# Patient Record
Sex: MALE | Race: WHITE | Employment: UNEMPLOYED | URBAN - METROPOLITAN AREA
[De-identification: names, ages, dates, MRNs, and addresses within clinical notes are randomized per-mention and may not be internally consistent; named-entity substitution may affect disease eponyms.]

---

## 2018-12-21 ENCOUNTER — OFFICE VISIT (OUTPATIENT)
Dept: FAMILY MEDICINE CLINIC | Age: 28
End: 2018-12-21

## 2018-12-21 VITALS
DIASTOLIC BLOOD PRESSURE: 73 MMHG | OXYGEN SATURATION: 98 % | RESPIRATION RATE: 18 BRPM | HEART RATE: 84 BPM | BODY MASS INDEX: 32.47 KG/M2 | WEIGHT: 253 LBS | SYSTOLIC BLOOD PRESSURE: 138 MMHG | TEMPERATURE: 98.1 F | HEIGHT: 74 IN

## 2018-12-21 DIAGNOSIS — F84.0 AUTISM SPECTRUM DISORDER: ICD-10-CM

## 2018-12-21 DIAGNOSIS — F82 MOTOR DEVELOPMENTAL DELAY: ICD-10-CM

## 2018-12-21 DIAGNOSIS — E66.9 OBESITY (BMI 30.0-34.9): ICD-10-CM

## 2018-12-21 DIAGNOSIS — Z13.220 LIPID SCREENING: ICD-10-CM

## 2018-12-21 DIAGNOSIS — Z00.00 ENCOUNTER FOR PREVENTIVE CARE: ICD-10-CM

## 2018-12-21 DIAGNOSIS — G70.9 NEUROMUSCULAR DISEASE (HCC): Primary | ICD-10-CM

## 2018-12-21 DIAGNOSIS — Z23 ENCOUNTER FOR IMMUNIZATION: ICD-10-CM

## 2018-12-21 DIAGNOSIS — R48.0 DYSLEXIA: ICD-10-CM

## 2018-12-21 RX ORDER — TRAZODONE HYDROCHLORIDE 100 MG/1
TABLET ORAL
Refills: 5 | COMMUNITY
Start: 2018-12-08

## 2018-12-21 RX ORDER — METHYLPHENIDATE HYDROCHLORIDE 36 MG/1
TABLET ORAL
Refills: 0 | COMMUNITY
Start: 2018-11-14

## 2018-12-21 RX ORDER — ESCITALOPRAM OXALATE 20 MG/1
TABLET ORAL
Refills: 5 | COMMUNITY
Start: 2018-12-08

## 2018-12-21 NOTE — PROGRESS NOTES
Chief Complaint   Patient presents with    New Patient     establish care last PCP Dr. Beau Olvera last seen a few years ago      1. Have you been to the ER, urgent care clinic since your last visit? Hospitalized since your last visit? No    2. Have you seen or consulted any other health care providers outside of the [address] [place] [address] since your last visit? Include any pap smears or colon screening.  No

## 2018-12-21 NOTE — PROGRESS NOTES
[facility]  [address] [facility] [address] [place], [address]  [phone]             Date of visit: 12/21/18   Subjective:      History obtained from:  the patient. Antonieta Ware is a 33 y.o. male who presents today for new patient    Used to see Dr. Joanne Field  Used to get bloodwork, has been a couple of years    Has autism and some developmental problems that have limited him  Birth was on time, dad notes apgars were low and cord was wrapped around neck    Had club foot  Both feet in casts for months  Used to see Dr. Leti De Leon orthpedist    Later identified neuromuscular developmental problems and autism.   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 Leia Bryson    Patient Active Problem List    Diagnosis Date Noted    Autism spectrum disorder 12/21/2018    Dyslexia 12/21/2018    Neuromuscular disease ([address] 12/21/2018    Motor developmental delay 12/21/2018    Obesity (BMI 30.0-34.9) 12/21/2018     Current Outpatient Medications   Medication Sig Dispense Refill    escitalopram oxalate (LEXAPRO) 20 mg tablet TAKE 2&1/2 TABLETS BY MOUTH EVERY MORNING  5    methylphenidate ER 36 mg 24 hr tab TAKE 2 TABLETS EVERY MORNING  0    traZODone (DESYREL) 100 mg tablet TAKE 2 TABLETS BY MOUTH AT BEDTIME  5     No Known Allergies  Past Medical History:   Diagnosis Date    Autism      History reviewed. No pertinent surgical history. 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[employer]    Doesn't drive    Lives with parents        Review of Systems  Card: denies chest pain  Pulm: denies shortness of breath  GI: denies abd pain  Psych: denies depressed mood  Neuro: denies frequent headaches  All: admits to pollen allergies, doesn't take meds  Ent: denies cold symptpoms  Skin ; denies lesions  MSK: admits to mild knee pains at times   denies urinary problems     Objective:     Vitals:    18 1338   BP: 138/73   Pulse: 84   Resp: 18   Temp: 98.1 °F (36.7 °C)   TempSrc: Oral   SpO2: 98%   Weight: 253 lb (114.8 kg)   Height: 6' 2\" (1.88 m)     Body mass index is 32.48 kg/m².      General: stated age,obese, and in NAD  Eyes: PERRL, EOMI, no redness or drainage  Nose: no drainage  Mouth: no lesions  Throat: no erythema, exudate or swelling  Neck: supple, symmetrical, trachea midline, no adenopathy and thyroid: not enlarged, symmetric, no tenderness/mass/nodules  Lungs:  clear to auscultation w/o rales, rhonchi, wheezes w/normal effort and no use of accessory muscles of respiration   Heart: regular rate and rhythm, S1, S2 normal, no murmur, click, rub or gallop  Abdomen: soft, nontender, no masses  Ext:  No edema noted. Lymph: no cervical adenopathy appreciated  Skin:  Normal. and no rash or abnormalities   Neuro: normal gait, CN 2-12 intact  Psych: alert and oriented to person, place, time and situation and Speech: appropriate quality, quantity and organization of sentences, not the best eye contact but very happy affect    Assessment/Plan:       ICD-10-CM ICD-9-CM    1. Neuromuscular disease ([address] G70.9 358.9 CBC WITH AUTOMATED DIFF      METABOLIC PANEL, COMPREHENSIVE      TSH 3RD GENERATION   2. Autism spectrum disorder F84.0 299.00    3. Dyslexia R48.0 784.61    4. Motor developmental delay F82 315.4    5. Encounter for immunization Z23 V03.89 INFLUENZA VIRUS VAC QUAD,SPLIT,PRESV FREE SYRINGE IM      AL IMMUNIZ ADMIN,1 SINGLE/COMB VAC/TOXOID   6. Obesity (BMI 30.0-34. 9) E66.9 278.00 TSH 3RD GENERATION      HEMOGLOBIN A1C WITH EAG   7. Lipid screening Z13.220 V77.91 LIPID PANEL   8. Encounter for preventive care Z00.00 V70.0 CBC WITH AUTOMATED DIFF      METABOLIC PANEL, COMPREHENSIVE      LIPID PANEL      TSH 3RD GENERATION      HEMOGLOBIN A1C WITH EAG        Orders Placed This Encounter    Influenza virus vaccine (QUADRIVALENT PRES FREE SYRINGE) IM (99060)    CBC WITH AUTOMATED DIFF    METABOLIC PANEL, COMPREHENSIVE    LIPID PANEL    TSH 3RD GENERATION    HEMOGLOBIN A1C WITH EAG    AL IMMUNIZ ADMIN,1 SINGLE/COMB VAC/TOXOID    escitalopram oxalate (LEXAPRO) 20 mg tablet    methylphenidate ER 36 mg 24 hr tab    traZODone (DESYREL) 100 mg tablet     Overall doing well with his meds, mostly discussed preventive care, need to lose weight, exercise more, eat well  He seems somewhat motivated, dad tries to help him  Labs as above, as it has been a while  Need records to make sure I am not missing anything else, and also to see his shot record    Discussed the diagnosis and plan and he expressed understanding.     Follow-up Disposition:  Return in about 1 year (around 12/21/2019) for Full Physical.    Cristo Resendiz Alexsandra Mata MD

## 2018-12-21 NOTE — PATIENT INSTRUCTIONS
Please do bring the records by; I will go through them and copy what we might need, then call you to pick them up         Well Visit, Ages 25 to 48: Care Instructions  Your Care Instructions    Physical exams can help you stay healthy. Your doctor has checked your overall health and may have suggested ways to take good care of yourself. He or she also may have recommended tests. At home, you can help prevent illness with healthy eating, regular exercise, and other steps. Follow-up care is a key part of your treatment and safety. Be sure to make and go to all appointments, and call your doctor if you are having problems. It's also a good idea to know your test results and keep a list of the medicines you take. How can you care for yourself at home? · Reach and stay at a healthy weight. This will lower your risk for many problems, such as obesity, diabetes, heart disease, and high blood pressure. · Get at least 30 minutes of physical activity on most days of the week. Walking is a good choice. You also may want to do other activities, such as running, swimming, cycling, or playing tennis or team sports. Discuss any changes in your exercise program with your doctor. · Do not smoke or allow others to smoke around you. If you need help quitting, talk to your doctor about stop-smoking programs and medicines. These can increase your chances of quitting for good. · Talk to your doctor about whether you have any risk factors for sexually transmitted infections (STIs). Having one sex partner (who does not have STIs and does not have sex with anyone else) is a good way to avoid these infections. · Use birth control if you do not want to have children at this time. Talk with your doctor about the choices available and what might be best for you. · Protect your skin from too much sun. When you're outdoors from 10 a.m. to 4 p.m., stay in the shade or cover up with clothing and a hat with a wide brim.  Wear sunglasses that block UV rays. Even when it's cloudy, put broad-spectrum sunscreen (SPF 30 or higher) on any exposed skin. · See a dentist one or two times a year for checkups and to have your teeth cleaned. · Wear a seat belt in the car. · Drink alcohol in moderation, if at all. That means no more than 2 drinks a day for men and 1 drink a day for women. Follow your doctor's advice about when to have certain tests. These tests can spot problems early. For everyone  · Cholesterol. Have the fat (cholesterol) in your blood tested after age 21. Your doctor will tell you how often to have this done based on your age, family history, or other things that can increase your risk for heart disease. · Blood pressure. Have your blood pressure checked during a routine doctor visit. Your doctor will tell you how often to check your blood pressure based on your age, your blood pressure results, and other factors. · Vision. Talk with your doctor about how often to have a glaucoma test.  · Diabetes. Ask your doctor whether you should have tests for diabetes. · Colon cancer. Have a test for colon cancer at age 48. You may have one of several tests. If you are younger than 48, you may need a test earlier if you have any risk factors. Risk factors include whether you already had a precancerous polyp removed from your colon or whether your parent, brother, sister, or child has had colon cancer. For women  · Breast exam and mammogram. Talk to your doctor about when you should have a clinical breast exam and a mammogram. Medical experts differ on whether and how often women under 50 should have these tests. Your doctor can help you decide what is right for you. · Pap test and pelvic exam. Begin Pap tests at age 24. A Pap test is the best way to find cervical cancer. The test often is part of a pelvic exam. Ask how often to have this test.  · Tests for sexually transmitted infections (STIs). Ask whether you should have tests for STIs.  You may be at risk if you have sex with more than one person, especially if your partners do not wear condoms. For men  · Tests for sexually transmitted infections (STIs). Ask whether you should have tests for STIs. You may be at risk if you have sex with more than one person, especially if you do not wear a condom. · Testicular cancer exam. Ask your doctor whether you should check your testicles regularly. · Prostate exam. Talk to your doctor about whether you should have a blood test (called a PSA test) for prostate cancer. Experts differ on whether and when men should have this test. Some experts suggest it if you are older than 39 and are -American or have a father or brother who got prostate cancer when he was younger than 72. When should you call for help? Watch closely for changes in your health, and be sure to contact your doctor if you have any problems or symptoms that concern you. Where can you learn more? Go to http://florecita-gerald.info/. Enter P072 in the search box to learn more about \"Well Visit, Ages 25 to 48: Care Instructions. \"  Current as of: March 29, 2018  Content Version: 11.8  © 5159-6714 Healthwise, Incorporated. Care instructions adapted under license by [employer] (which disclaims liability or warranty for this information). If you have questions about a medical condition or this instruction, always ask your healthcare professional. [address] any warranty or liability for your use of this information.

## 2018-12-28 PROBLEM — E78.6 LOW HDL (UNDER 40): Status: ACTIVE | Noted: 2018-12-28

## 2018-12-28 PROBLEM — R74.01 ELEVATED ALT MEASUREMENT: Status: ACTIVE | Noted: 2018-12-28

## 2018-12-28 LAB
ALBUMIN SERPL-MCNC: 4.9 G/DL (ref 3.5–5.5)
ALBUMIN/GLOB SERPL: 2.2 {RATIO} (ref 1.2–2.2)
ALP SERPL-CCNC: 50 IU/L (ref 39–117)
ALT SERPL-CCNC: 59 IU/L (ref 0–44)
AST SERPL-CCNC: 27 IU/L (ref 0–40)
BASOPHILS # BLD AUTO: 0 X10E3/UL (ref 0–0.2)
BASOPHILS NFR BLD AUTO: 0 %
BILIRUB SERPL-MCNC: 0.3 MG/DL (ref 0–1.2)
BUN SERPL-MCNC: 17 MG/DL (ref 6–20)
BUN/CREAT SERPL: 27 (ref 9–20)
CALCIUM SERPL-MCNC: 9.4 MG/DL (ref 8.7–10.2)
CHLORIDE SERPL-SCNC: 108 MMOL/L (ref 96–106)
CHOLEST SERPL-MCNC: 115 MG/DL (ref 100–199)
CO2 SERPL-SCNC: 21 MMOL/L (ref 20–29)
CREAT SERPL-MCNC: 0.63 MG/DL (ref 0.76–1.27)
EOSINOPHIL # BLD AUTO: 0.1 X10E3/UL (ref 0–0.4)
EOSINOPHIL NFR BLD AUTO: 2 %
ERYTHROCYTE [DISTWIDTH] IN BLOOD BY AUTOMATED COUNT: 13.7 % (ref 12.3–15.4)
EST. AVERAGE GLUCOSE BLD GHB EST-MCNC: 108 MG/DL
GLOBULIN SER CALC-MCNC: 2.2 G/DL (ref 1.5–4.5)
GLUCOSE SERPL-MCNC: 100 MG/DL (ref 65–99)
HBA1C MFR BLD: 5.4 % (ref 4.8–5.6)
HCT VFR BLD AUTO: 48.1 % (ref 37.5–51)
HDLC SERPL-MCNC: 32 MG/DL
HGB BLD-MCNC: 16.5 G/DL (ref 13–17.7)
IMM GRANULOCYTES # BLD: 0 X10E3/UL (ref 0–0.1)
IMM GRANULOCYTES NFR BLD: 1 %
INTERPRETATION, 910389: NORMAL
LDLC SERPL CALC-MCNC: 61 MG/DL (ref 0–99)
LYMPHOCYTES # BLD AUTO: 2.1 X10E3/UL (ref 0.7–3.1)
LYMPHOCYTES NFR BLD AUTO: 37 %
MCH RBC QN AUTO: 30 PG (ref 26.6–33)
MCHC RBC AUTO-ENTMCNC: 34.3 G/DL (ref 31.5–35.7)
MCV RBC AUTO: 88 FL (ref 79–97)
MONOCYTES # BLD AUTO: 0.3 X10E3/UL (ref 0.1–0.9)
MONOCYTES NFR BLD AUTO: 6 %
NEUTROPHILS # BLD AUTO: 3.1 X10E3/UL (ref 1.4–7)
NEUTROPHILS NFR BLD AUTO: 54 %
PLATELET # BLD AUTO: 204 X10E3/UL (ref 150–379)
POTASSIUM SERPL-SCNC: 4.6 MMOL/L (ref 3.5–5.2)
PROT SERPL-MCNC: 7.1 G/DL (ref 6–8.5)
RBC # BLD AUTO: 5.5 X10E6/UL (ref 4.14–5.8)
SODIUM SERPL-SCNC: 141 MMOL/L (ref 134–144)
TRIGL SERPL-MCNC: 111 MG/DL (ref 0–149)
TSH SERPL DL<=0.005 MIU/L-ACNC: 1.94 UIU/ML (ref 0.45–4.5)
VLDLC SERPL CALC-MCNC: 22 MG/DL (ref 5–40)
WBC # BLD AUTO: 5.7 X10E3/UL (ref 3.4–10.8)

## 2018-12-28 NOTE — PROGRESS NOTES
Sent in letter:  One of your liver tests is slightly high, and your HDL good cholesterol is low. This means you need more aerobic exercise. Just a 20-30 minute brisk walk daily would be perfect! We should recheck the liver test next year. Blood counts, kidney, sugar, electrolytes, and thyroid were fine!

## 2020-01-27 NOTE — PATIENT INSTRUCTIONS
Well Visit, Ages 25 to 48: Care Instructions Your Care Instructions Physical exams can help you stay healthy. Your doctor has checked your overall health and may have suggested ways to take good care of yourself. He or she also may have recommended tests. At home, you can help prevent illness with healthy eating, regular exercise, and other steps. Follow-up care is a key part of your treatment and safety. Be sure to make and go to all appointments, and call your doctor if you are having problems. It's also a good idea to know your test results and keep a list of the medicines you take. How can you care for yourself at home? · Reach and stay at a healthy weight. This will lower your risk for many problems, such as obesity, diabetes, heart disease, and high blood pressure. · Get at least 30 minutes of physical activity on most days of the week. Walking is a good choice. You also may want to do other activities, such as running, swimming, cycling, or playing tennis or team sports. Discuss any changes in your exercise program with your doctor. · Do not smoke or allow others to smoke around you. If you need help quitting, talk to your doctor about stop-smoking programs and medicines. These can increase your chances of quitting for good. · Talk to your doctor about whether you have any risk factors for sexually transmitted infections (STIs). Having one sex partner (who does not have STIs and does not have sex with anyone else) is a good way to avoid these infections. · Use birth control if you do not want to have children at this time. Talk with your doctor about the choices available and what might be best for you. · Protect your skin from too much sun. When you're outdoors from 10 a.m. to 4 p.m., stay in the shade or cover up with clothing and a hat with a wide brim. Wear sunglasses that block UV rays. Even when it's cloudy, put broad-spectrum sunscreen (SPF 30 or higher) on any exposed skin. · See a dentist one or two times a year for checkups and to have your teeth cleaned. · Wear a seat belt in the car. Follow your doctor's advice about when to have certain tests. These tests can spot problems early. For everyone · Cholesterol. Have the fat (cholesterol) in your blood tested after age 21. Your doctor will tell you how often to have this done based on your age, family history, or other things that can increase your risk for heart disease. · Blood pressure. Have your blood pressure checked during a routine doctor visit. Your doctor will tell you how often to check your blood pressure based on your age, your blood pressure results, and other factors. · Vision. Talk with your doctor about how often to have a glaucoma test. 
· Diabetes. Ask your doctor whether you should have tests for diabetes. · Colon cancer. Your risk for colorectal cancer gets higher as you get older. Some experts say that adults should start regular screening at age 48 and stop at age 76. Others say to start before age 48 or continue after age 76. Talk with your doctor about your risk and when to start and stop screening. For women · Breast exam and mammogram. Talk to your doctor about when you should have a clinical breast exam and a mammogram. Medical experts differ on whether and how often women under 50 should have these tests. Your doctor can help you decide what is right for you. · Cervical cancer screening test and pelvic exam. Begin with a Pap test at age 24. The test often is part of a pelvic exam. Starting at age 27, you may choose to have a Pap test, an HPV test, or both tests at the same time (called co-testing). Talk with your doctor about how often to have testing. · Tests for sexually transmitted infections (STIs). Ask whether you should have tests for STIs. You may be at risk if you have sex with more than one person, especially if your partners do not wear condoms. For men · Tests for sexually transmitted infections (STIs). Ask whether you should have tests for STIs. You may be at risk if you have sex with more than one person, especially if you do not wear a condom. · Testicular cancer exam. Ask your doctor whether you should check your testicles regularly. · Prostate exam. Talk to your doctor about whether you should have a blood test (called a PSA test) for prostate cancer. Experts differ on whether and when men should have this test. Some experts suggest it if you are older than 39 and are -American or have a father or brother who got prostate cancer when he was younger than 72. When should you call for help? Watch closely for changes in your health, and be sure to contact your doctor if you have any problems or symptoms that concern you. Where can you learn more? Go to http://florecita-gerald.info/. Enter P072 in the search box to learn more about \"Well Visit, Ages 25 to 48: Care Instructions. \" Current as of: December 13, 2018 Content Version: 12.2 © 3622-1288 [employer], [employer]orporated. Care instructions adapted under license by [employer] (which disclaims liability or warranty for this information). If you have questions about a medical condition or this instruction, always ask your healthcare professional. [address] any warranty or liability for your use of this information.

## 2020-01-27 NOTE — PROGRESS NOTES
[facility]  [place]. [place], [address]  [phone]             Date of visit: 1/28/20   Subjective:      History obtained from:  the patient, dad  Raymond White is a 26 y.o. male who presents today for physical, chronic problems    Has a neuromuscular disorder with motor delay  Has some chronic pain in right foot/ankle but doesn't complain of it much  Worse if he walks a lot  Has a custom insert but is old now    Eating a little better they think, dad thinks he still has extra carbs and salt  He snacks on \"whatever I can get my hands on\"  Exercise on his job, constant movement and lifting of heavy things  Not a lot of walking; can't do a lot with his foot problems from neuromuscular delay    Flu shot we are out but he might get at a pharmacy    Has lost 6lb since I saw him last year    Labs were all very normal last year    Has autism and pysch meds managed by psychiatrist      Allergies with dust  Sneezing, runny nose, maybe a little cough  Sometimes takes loratadine, it helps    Patient Active Problem List    Diagnosis Date Noted    Allergic rhinitis 01/29/2020    Chronic foot pain, right 01/28/2020    Elevated ALT measurement 12/28/2018    Low HDL (under 40) 12/28/2018    Autism spectrum disorder 12/21/2018    Dyslexia 12/21/2018    Neuromuscular disease ([address] 12/21/2018    Motor developmental delay 12/21/2018    Obesity (BMI 30.0-34.9) 12/21/2018     Current Outpatient Medications   Medication Sig Dispense Refill    loratadine (CLARITIN) 10 mg tablet Take 1 Tab by mouth daily as needed for Allergies.  escitalopram oxalate (LEXAPRO) 20 mg tablet TAKE 2&1/2 TABLETS BY MOUTH EVERY MORNING  5    methylphenidate ER 36 mg 24 hr tab TAKE 2 TABLETS EVERY MORNING  0    traZODone (DESYREL) 100 mg tablet TAKE 2 TABLETS BY MOUTH AT BEDTIME  5     No Known Allergies  Past Medical History:   Diagnosis Date    Autism      History reviewed.  No pertinent surgical history. Family History   Problem Relation Age of Onset    Hypertension Father     Migraines Father     Heart Disease Maternal Grandmother     Bleeding Prob Maternal Grandmother         prostate    Cancer Paternal Grandfather      Social History     Tobacco Use    Smoking status: Former Smoker     Last attempt to quit: 12/21/2016     Years since quitting: 3.1    Smokeless tobacco: Never Used   Substance Use Topics    Alcohol use: Yes     Comment: occasional       Social History     Patient does not qualify to have social determinant information on file (likely too young). 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 General: stated age,obese, and in NAD  Eyes: PERRL, EOMI, no redness or drainage  Nose: no drainage  Mouth: no lesions  Throat: no erythema, exudate or swelling  Neck: supple, symmetrical, trachea midline, no adenopathy and thyroid: not enlarged, symmetric, no tenderness/mass/nodules  Lungs:  clear to auscultation w/o rales, rhonchi, wheezes w/normal effort and no use of accessory muscles of respiration   Heart: regular rate and rhythm, S1, S2 normal, no murmur, click, rub or gallop  Abdomen: soft, nontender, no masses  Ext/MSK:  No edema noted. Right ankle without swelling, no point tenderness. Right foot no swelling or point tenderness. Normal ROM foot and ankle.  Strength seems good in all major muscle groups  Lymph: no cervical adenopathy appreciated  Skin:  Normal. and no rash or abnormalities   Neuro: normal gait, CN 2-12 intact  Psych: alert and mildly anxious affect, and Speech: appropriate quality, quantity and organization of sentences, little tangential at times    Assessment/Plan:       ICD-10-CM ICD-9-CM    1. Chronic foot pain, right M79.671 729.5     G89.29 338.29    2. Neuromuscular disease ([address] G70.9 358.9    3. Allergic rhinitis, unspecified seasonality, unspecified trigger J30.9 477.9    4. Motor developmental delay F82 315.4    5. Obesity (BMI 30.0-34. 9) E66.9 278.00    6. Autism spectrum disorder F84.0 299.00    7. Elevated ALT measurement I15.9 028.4 METABOLIC PANEL, COMPREHENSIVE   8. Low HDL (under 40) E78.6 272.5 LIPID PANEL   9. Encounter for screening for HIV Z11.4 V73.89 HIV 1/2 AG/AB, 4TH GENERATION,W RFLX CONFIRM   10. Encounter for hepatitis C screening test for low risk patient Z11.59 V73.89 HEPATITIS C AB        Orders Placed This Encounter    LIPID PANEL    METABOLIC PANEL, COMPREHENSIVE    HEPATITIS C AB    HIV 1/2 AG/AB, 4TH GENERATION,W RFLX CONFIRM    loratadine (CLARITIN) 10 mg tablet       Chronic foot pain related to old neuromuscular problem with weakness, although I can't detect much on exam today  will refer to PT later if he wants, he declines for now  They might be able to recommend new insert or brace    Allergies doing well on claritin    Weight too high but has lost a little  Discussed diet changes  Needs much more regular exercise  Encouraged to do stationary bike; they could get one    We are out of flu shot; encouraged him to get it at pharmacy and from here on out get it each October    Mood doing well on meds, followed by psych    Discussed the diagnosis and plan and he expressed understanding. Follow-up and Dispositions    · Return in about 1 year (around 1/28/2021).          Rayray Perkins MD

## 2020-01-28 ENCOUNTER — OFFICE VISIT (OUTPATIENT)
Dept: FAMILY MEDICINE CLINIC | Age: 30
End: 2020-01-28

## 2020-01-28 VITALS
OXYGEN SATURATION: 99 % | WEIGHT: 247.6 LBS | HEART RATE: 89 BPM | HEIGHT: 74 IN | TEMPERATURE: 97.9 F | DIASTOLIC BLOOD PRESSURE: 78 MMHG | BODY MASS INDEX: 31.78 KG/M2 | SYSTOLIC BLOOD PRESSURE: 126 MMHG | RESPIRATION RATE: 18 BRPM

## 2020-01-28 DIAGNOSIS — M79.671 CHRONIC FOOT PAIN, RIGHT: Primary | ICD-10-CM

## 2020-01-28 DIAGNOSIS — F82 MOTOR DEVELOPMENTAL DELAY: ICD-10-CM

## 2020-01-28 DIAGNOSIS — G70.9 NEUROMUSCULAR DISEASE (HCC): ICD-10-CM

## 2020-01-28 DIAGNOSIS — Z11.59 ENCOUNTER FOR HEPATITIS C SCREENING TEST FOR LOW RISK PATIENT: ICD-10-CM

## 2020-01-28 DIAGNOSIS — J30.9 ALLERGIC RHINITIS, UNSPECIFIED SEASONALITY, UNSPECIFIED TRIGGER: ICD-10-CM

## 2020-01-28 DIAGNOSIS — E78.6 LOW HDL (UNDER 40): ICD-10-CM

## 2020-01-28 DIAGNOSIS — E66.9 OBESITY (BMI 30.0-34.9): ICD-10-CM

## 2020-01-28 DIAGNOSIS — Z11.4 ENCOUNTER FOR SCREENING FOR HIV: ICD-10-CM

## 2020-01-28 DIAGNOSIS — G89.29 CHRONIC FOOT PAIN, RIGHT: Primary | ICD-10-CM

## 2020-01-28 DIAGNOSIS — F84.0 AUTISM SPECTRUM DISORDER: ICD-10-CM

## 2020-01-28 DIAGNOSIS — R74.01 ELEVATED ALT MEASUREMENT: ICD-10-CM

## 2020-01-28 RX ORDER — LORATADINE 10 MG/1
10 TABLET ORAL
COMMUNITY

## 2020-01-28 NOTE — PROGRESS NOTES
Chief Complaint   Patient presents with    Complete Physical     1. Have you been to the ER, urgent care clinic since your last visit? Hospitalized since your last visit? No    2. Have you seen or consulted any other health care providers outside of the [address] [place] [address] since your last visit? Include any pap smears or colon screening.  No

## 2020-01-29 PROBLEM — R74.01 ELEVATED ALT MEASUREMENT: Status: RESOLVED | Noted: 2018-12-28 | Resolved: 2020-01-29

## 2020-01-29 PROBLEM — J30.9 ALLERGIC RHINITIS: Status: ACTIVE | Noted: 2020-01-29

## 2020-01-29 LAB
ALBUMIN SERPL-MCNC: 5.2 G/DL (ref 4.1–5.2)
ALBUMIN/GLOB SERPL: 2.3 {RATIO} (ref 1.2–2.2)
ALP SERPL-CCNC: 50 IU/L (ref 39–117)
ALT SERPL-CCNC: 38 IU/L (ref 0–44)
AST SERPL-CCNC: 15 IU/L (ref 0–40)
BILIRUB SERPL-MCNC: 0.5 MG/DL (ref 0–1.2)
BUN SERPL-MCNC: 16 MG/DL (ref 6–20)
BUN/CREAT SERPL: 19 (ref 9–20)
CALCIUM SERPL-MCNC: 9.3 MG/DL (ref 8.7–10.2)
CHLORIDE SERPL-SCNC: 105 MMOL/L (ref 96–106)
CHOLEST SERPL-MCNC: 117 MG/DL (ref 100–199)
CO2 SERPL-SCNC: 20 MMOL/L (ref 20–29)
CREAT SERPL-MCNC: 0.83 MG/DL (ref 0.76–1.27)
GLOBULIN SER CALC-MCNC: 2.3 G/DL (ref 1.5–4.5)
GLUCOSE SERPL-MCNC: 98 MG/DL (ref 65–99)
HCV AB S/CO SERPL IA: <0.1 S/CO RATIO (ref 0–0.9)
HDLC SERPL-MCNC: 34 MG/DL
HIV 1+2 AB+HIV1 P24 AG SERPL QL IA: NON REACTIVE
INTERPRETATION, 910389: NORMAL
LDLC SERPL CALC-MCNC: 70 MG/DL (ref 0–99)
POTASSIUM SERPL-SCNC: 4.5 MMOL/L (ref 3.5–5.2)
PROT SERPL-MCNC: 7.5 G/DL (ref 6–8.5)
SODIUM SERPL-SCNC: 143 MMOL/L (ref 134–144)
TRIGL SERPL-MCNC: 64 MG/DL (ref 0–149)
VLDLC SERPL CALC-MCNC: 13 MG/DL (ref 5–40)

## 2020-01-29 NOTE — PROGRESS NOTES
Sent in letter:  Liver tests were normal, as were kidney, sugar, and electrolytes. Cholesterol was improved and doesn't need to be rechecked for a lot of years. However, I would encourage you to eat plenty of plant foods (fruits, veggies, beans, whole grains), including plant fats (nuts, avocados, olive oil). You should try to avoid red meats, hard cheeses, butter, ice cream and other fatty dairy, as well as avoiding junk foods like chips, crackers, and cookies. Getting plenty of exercise also helps! Routine screening tests for hepatitis C and HIV were negative.

## 2021-01-07 ENCOUNTER — OFFICE VISIT (OUTPATIENT)
Dept: FAMILY MEDICINE CLINIC | Age: 31
End: 2021-01-07
Payer: COMMERCIAL

## 2021-01-07 VITALS
TEMPERATURE: 97.2 F | WEIGHT: 254 LBS | HEIGHT: 74 IN | DIASTOLIC BLOOD PRESSURE: 72 MMHG | HEART RATE: 65 BPM | OXYGEN SATURATION: 98 % | BODY MASS INDEX: 32.6 KG/M2 | SYSTOLIC BLOOD PRESSURE: 123 MMHG

## 2021-01-07 DIAGNOSIS — G89.29 CHRONIC FOOT PAIN, RIGHT: ICD-10-CM

## 2021-01-07 DIAGNOSIS — Z00.00 ENCOUNTER FOR PREVENTIVE CARE: ICD-10-CM

## 2021-01-07 DIAGNOSIS — F82 MOTOR DEVELOPMENTAL DELAY: ICD-10-CM

## 2021-01-07 DIAGNOSIS — G70.9 NEUROMUSCULAR DISEASE (HCC): Primary | ICD-10-CM

## 2021-01-07 DIAGNOSIS — Z23 ENCOUNTER FOR IMMUNIZATION: ICD-10-CM

## 2021-01-07 DIAGNOSIS — F84.0 AUTISM SPECTRUM DISORDER: ICD-10-CM

## 2021-01-07 DIAGNOSIS — E66.9 OBESITY (BMI 30.0-34.9): ICD-10-CM

## 2021-01-07 DIAGNOSIS — M79.671 CHRONIC FOOT PAIN, RIGHT: ICD-10-CM

## 2021-01-07 PROCEDURE — 90686 IIV4 VACC NO PRSV 0.5 ML IM: CPT | Performed by: FAMILY MEDICINE

## 2021-01-07 PROCEDURE — 99214 OFFICE O/P EST MOD 30 MIN: CPT | Performed by: FAMILY MEDICINE

## 2021-01-07 PROCEDURE — 90471 IMMUNIZATION ADMIN: CPT | Performed by: FAMILY MEDICINE

## 2021-01-07 NOTE — PROGRESS NOTES
Bryant White [facility] [place]. [place], [address]  [phone]             Date of visit:1/7/2021     Subjective:      History obtained from:  the patient, nemo Abrams is a 31 y.o. 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 Has been a while since he has seen a foot specialist and may want to go back when pandemic better    Not eating particularly well but can work on that  [employer], some fruits  Parents encourage him to eat better and exercise    Wants flu shot today    Has autism and pysch meds managed by psychiatrist      Still on claritin prn allergies    Would prefer to avoid labs; I think fine since ok 1-2 years ago    Patient Active Problem List    Diagnosis Date Noted    Allergic rhinitis 01/29/2020    Chronic foot pain, right 01/28/2020    Low HDL (under 40) 12/28/2018    Autism spectrum disorder 12/21/2018    Dyslexia 12/21/2018    Neuromuscular disease ([address] 12/21/2018    Motor developmental delay 12/21/2018    Obesity (BMI 30.0-34.9) 12/21/2018     Current Outpatient Medications   Medication Sig Dispense Refill    loratadine (CLARITIN) 10 mg tablet Take 1 Tab by mouth daily as needed for Allergies.  escitalopram oxalate (LEXAPRO) 20 mg tablet TAKE 2&1/2 TABLETS BY MOUTH EVERY MORNING  5    methylphenidate ER 36 mg 24 hr tab TAKE 2 TABLETS EVERY MORNING  0    traZODone (DESYREL) 100 mg tablet TAKE 2 TABLETS BY MOUTH AT BEDTIME  5     No Known Allergies  Past Medical History:   Diagnosis Date    Autism      No past surgical history on file.   Family History Problem Relation Age of Onset    Hypertension Father     Migraines Father     Heart Disease Maternal Grandmother     Bleeding Prob Maternal Grandmother         prostate    Cancer Paternal Grandfather      Social History     Tobacco Use    Smoking status: Former Smoker     Quit date: 2016     Years since quittin.0    Smokeless tobacco: Never Used   Substance Use Topics    Alcohol use: Yes     Comment: occasional       Social History     Social History Narrative    Volunteers regularly at [employer]    Sets up equipment at [employer]    Doesn't drive    Lives with parents      Review of Systems  Gen: denies fever  Pulm: denies cough     Objective:     Vitals:    21 1613   BP: 123/72   Pulse: 65   Temp: 97.2 °F (36.2 °C)   TempSrc: Temporal   SpO2: 98%   Weight: 254 lb (115.2 kg)   Height: 6' 2\" (1.88 m)     Body mass index is 32.61 kg/m². General: stated age, well-developed, and in NAD  Eyes: PERRL, EOMI, no redness or drainage  Nose: no drainage  Ears: TMs clear  Mouth: no lesions  Throat: no erythema, exudate or swelling  Neck: supple, symmetrical, trachea midline, no adenopathy and thyroid: not enlarged, symmetric, no tenderness/mass/nodules  Lungs:  clear to auscultation w/o rales, rhonchi, wheezes w/normal effort and no use of accessory muscles of respiration   Heart: regular rate and rhythm, S1, S2 normal, no murmur, click, rub or gallop  Abdomen: soft, nontender, no masses  Ext:  No edema noted. Lymph: no cervical adenopathy appreciated  Skin:  Normal. and no rash or abnormalities   Neuro: CN 2-12 intact  Psych: alert and calm, answers questions appropriately, has good sense of humor, not the best eye contact  Assessment/Plan:       ICD-10-CM ICD-9-CM    1. Neuromuscular disease ([facility] [address]  G70.9 358.9    2. Chronic foot pain, right  M79.671 729.5     G89.29 338.29    3. Motor developmental delay  F82 315.4    4. Obesity (BMI 30.0-34. 9)  E66.9 278.00    5.  Autism spectrum disorder  F84.0 299.00    6. Encounter for immunization  Z23 V03.89 INFLUENZA VIRUS VAC QUAD,SPLIT,PRESV FREE SYRINGE IM      CA IMMUNIZ ADMIN,1 SINGLE/COMB VAC/TOXOID   7. Encounter for preventive care  Z00.00 V70.0         Orders Placed This Encounter    CA IMMUNIZ ADMIN,1 SINGLE/COMB VAC/TOXOID    INFLUENZA VIRUS VACCINE QUADRIVALENT, PRESERVATIVE FREE SYRINGE (70888)     Chronic problems stable  No med changes  Advised of name of foot orthopedist Freya Alejandra) he could see if he wanted  Mostly it seems feet not bothering him    Discussed the diagnosis and plan and he expressed understanding.     Follow-up and Dispositions    · Return in about 1 year (around 1/7/2022) for Full Taras Rice MD

## 2021-01-07 NOTE — PATIENT INSTRUCTIONS
Dr. Pina Monday [address] [facility], [address] [place] [address] 
[phone] Well Visit, Ages 25 to 48: Care Instructions Your Care Instructions Physical exams can help you stay healthy. Your doctor has checked your overall health and may have suggested ways to take good care of yourself. He or she also may have recommended tests. At home, you can help prevent illness with healthy eating, regular exercise, and other steps. Follow-up care is a key part of your treatment and safety. Be sure to make and go to all appointments, and call your doctor if you are having problems. It's also a good idea to know your test results and keep a list of the medicines you take. How can you care for yourself at home? · Reach and stay at a healthy weight. This will lower your risk for many problems, such as obesity, diabetes, heart disease, and high blood pressure. · Get at least 30 minutes of physical activity on most days of the week. Walking is a good choice. You also may want to do other activities, such as running, swimming, cycling, or playing tennis or team sports. Discuss any changes in your exercise program with your doctor. · Do not smoke or allow others to smoke around you. If you need help quitting, talk to your doctor about stop-smoking programs and medicines. These can increase your chances of quitting for good. · Talk to your doctor about whether you have any risk factors for sexually transmitted infections (STIs). Having one sex partner (who does not have STIs and does not have sex with anyone else) is a good way to avoid these infections. · Use birth control if you do not want to have children at this time. Talk with your doctor about the choices available and what might be best for you. · Protect your skin from too much sun. When you're outdoors from 10 a.m. to 4 p.m., stay in the shade or cover up with clothing and a hat with a wide brim. Wear sunglasses that block UV rays. Even when it's cloudy, put broad-spectrum sunscreen (SPF 30 or higher) on any exposed skin. · See a dentist one or two times a year for checkups and to have your teeth cleaned. · Wear a seat belt in the car. Follow your doctor's advice about when to have certain tests. These tests can spot problems early. For everyone · Cholesterol. Have the fat (cholesterol) in your blood tested after age 21. Your doctor will tell you how often to have this done based on your age, family history, or other things that can increase your risk for heart disease. · Blood pressure. Have your blood pressure checked during a routine doctor visit. Your doctor will tell you how often to check your blood pressure based on your age, your blood pressure results, and other factors. · Vision. Talk with your doctor about how often to have a glaucoma test. 
· Diabetes. Ask your doctor whether you should have tests for diabetes. · Colon cancer. Your risk for colorectal cancer gets higher as you get older. Some experts say that adults should start regular screening at age 48 and stop at age 76. Others say to start before age 48 or continue after age 76. Talk with your doctor about your risk and when to start and stop screening. For women · Breast exam and mammogram. Talk to your doctor about when you should have a clinical breast exam and a mammogram. Medical experts differ on whether and how often women under 50 should have these tests. Your doctor can help you decide what is right for you. · Cervical cancer screening test and pelvic exam. Begin with a Pap test at age 24. The test often is part of a pelvic exam. Starting at age 27, you may choose to have a Pap test, an HPV test, or both tests at the same time (called co-testing). Talk with your doctor about how often to have testing. · Tests for sexually transmitted infections (STIs). Ask whether you should have tests for STIs. You may be at risk if you have sex with more than one person, especially if your partners do not wear condoms. For men · Tests for sexually transmitted infections (STIs). Ask whether you should have tests for STIs. You may be at risk if you have sex with more than one person, especially if you do not wear a condom. · Testicular cancer exam. Ask your doctor whether you should check your testicles regularly. · Prostate exam. Talk to your doctor about whether you should have a blood test (called a PSA test) for prostate cancer. Experts differ on whether and when men should have this test. Some experts suggest it if you are older than 39 and are -American or have a father or brother who got prostate cancer when he was younger than 72. When should you call for help? Watch closely for changes in your health, and be sure to contact your doctor if you have any problems or symptoms that concern you. Where can you learn more? Go to http://www.[employer].com/ Enter P072 in the search box to learn more about \"Well Visit, Ages 25 to 48: Care Instructions. \" Current as of: May 27, 2020               Content Version: 12.6 © 2006-2020 [employer]. Care instructions adapted under license by [employer] (which disclaims liability or warranty for this information). If you have questions about a medical condition or this instruction, always ask your healthcare professional. [place] [address] any warranty or liability for your use of this information.

## 2021-01-07 NOTE — PROGRESS NOTES
Room: 39  Identified pt with two pt identifiers(name and ). Reviewed record in preparation for visit and have obtained necessary documentation. All patient medications has been reviewed. Chief Complaint   Patient presents with    Complete Physical       Health Maintenance Due   Topic    Flu Vaccine (1)     Flu: Want  Vitals:    21 1613   BP: 123/72   Pulse: 65   Temp: 97.2 °F (36.2 °C)   TempSrc: Temporal   SpO2: 98%   Weight: 254 lb (115.2 kg)   Height: 6' 2\" (1.88 m)   PainSc:   0 - No pain       4. Have you been to the ER, urgent care clinic since your last visit? Hospitalized since your last visit? No    5. Have you seen or consulted any other health care providers outside of the [address] [place] [address] since your last visit? Include any pap smears or colon screening. Dr Pooja Jain, Psy    6. Would you like to receive your flu shot today? YES    Patient is accompanied by father I have received verbal consent from Joan Dawson to discuss any/all medical information while they are present in the room. 3 most recent PHQ Screens 2021   Little interest or pleasure in doing things Not at all   Feeling down, depressed, irritable, or hopeless Not at all   Total Score PHQ 2 0     Abuse Screening Questionnaire 2021   Do you ever feel afraid of your partner? N   Are you in a relationship with someone who physically or mentally threatens you? N   Is it safe for you to go home?  Lelia Diaz

## 2021-10-08 ENCOUNTER — TELEPHONE (OUTPATIENT)
Dept: FAMILY MEDICINE CLINIC | Age: 31
End: 2021-10-08

## 2021-10-08 NOTE — TELEPHONE ENCOUNTER
Called and LVM with patient to call me back so I can have more clarification of the rash on the son's ankle

## 2021-10-08 NOTE — TELEPHONE ENCOUNTER
Pt stated that son has a rash on his ankles that the father would like looked at today or tomorrow. Please call back and advise.

## 2022-02-15 ENCOUNTER — OFFICE VISIT (OUTPATIENT)
Dept: FAMILY MEDICINE CLINIC | Age: 32
End: 2022-02-15
Payer: COMMERCIAL

## 2022-02-15 VITALS
HEIGHT: 74 IN | WEIGHT: 260 LBS | OXYGEN SATURATION: 98 % | SYSTOLIC BLOOD PRESSURE: 131 MMHG | HEART RATE: 87 BPM | RESPIRATION RATE: 16 BRPM | TEMPERATURE: 98.4 F | BODY MASS INDEX: 33.37 KG/M2 | DIASTOLIC BLOOD PRESSURE: 80 MMHG

## 2022-02-15 DIAGNOSIS — E66.9 OBESITY (BMI 30.0-34.9): ICD-10-CM

## 2022-02-15 DIAGNOSIS — F82 MOTOR DEVELOPMENTAL DELAY: ICD-10-CM

## 2022-02-15 DIAGNOSIS — G89.29 CHRONIC FOOT PAIN, RIGHT: ICD-10-CM

## 2022-02-15 DIAGNOSIS — Z13.0 SCREENING FOR DEFICIENCY ANEMIA: ICD-10-CM

## 2022-02-15 DIAGNOSIS — F84.0 AUTISM SPECTRUM DISORDER: ICD-10-CM

## 2022-02-15 DIAGNOSIS — Z00.00 ANNUAL PHYSICAL EXAM: Primary | ICD-10-CM

## 2022-02-15 DIAGNOSIS — R01.1 MURMUR, CARDIAC: ICD-10-CM

## 2022-02-15 DIAGNOSIS — R48.0 DYSLEXIA: ICD-10-CM

## 2022-02-15 DIAGNOSIS — G70.9 NEUROMUSCULAR DISEASE (HCC): ICD-10-CM

## 2022-02-15 DIAGNOSIS — Z13.220 SCREENING, LIPID: ICD-10-CM

## 2022-02-15 DIAGNOSIS — M79.671 CHRONIC FOOT PAIN, RIGHT: ICD-10-CM

## 2022-02-15 DIAGNOSIS — Z13.1 SCREENING FOR DIABETES MELLITUS: ICD-10-CM

## 2022-02-15 DIAGNOSIS — Z13.29 SCREENING FOR THYROID DISORDER: ICD-10-CM

## 2022-02-15 LAB
ALBUMIN SERPL-MCNC: 4.1 G/DL (ref 3.5–5)
ALBUMIN/GLOB SERPL: 1.3 {RATIO} (ref 1.1–2.2)
ALP SERPL-CCNC: 50 U/L (ref 45–117)
ALT SERPL-CCNC: 94 U/L (ref 12–78)
ANION GAP SERPL CALC-SCNC: 5 MMOL/L (ref 5–15)
AST SERPL-CCNC: 36 U/L (ref 15–37)
BILIRUB SERPL-MCNC: 0.4 MG/DL (ref 0.2–1)
BUN SERPL-MCNC: 18 MG/DL (ref 6–20)
BUN/CREAT SERPL: 24 (ref 12–20)
CALCIUM SERPL-MCNC: 9 MG/DL (ref 8.5–10.1)
CHLORIDE SERPL-SCNC: 107 MMOL/L (ref 97–108)
CHOLEST SERPL-MCNC: 137 MG/DL
CO2 SERPL-SCNC: 28 MMOL/L (ref 21–32)
CREAT SERPL-MCNC: 0.74 MG/DL (ref 0.7–1.3)
ERYTHROCYTE [DISTWIDTH] IN BLOOD BY AUTOMATED COUNT: 12.3 % (ref 11.5–14.5)
EST. AVERAGE GLUCOSE BLD GHB EST-MCNC: 108 MG/DL
GLOBULIN SER CALC-MCNC: 3.1 G/DL (ref 2–4)
GLUCOSE SERPL-MCNC: 87 MG/DL (ref 65–100)
HBA1C MFR BLD: 5.4 % (ref 4–5.6)
HCT VFR BLD AUTO: 48 % (ref 36.6–50.3)
HDLC SERPL-MCNC: 39 MG/DL
HDLC SERPL: 3.5 {RATIO} (ref 0–5)
HGB BLD-MCNC: 15.7 G/DL (ref 12.1–17)
LDLC SERPL CALC-MCNC: 75.8 MG/DL (ref 0–100)
MCH RBC QN AUTO: 29.3 PG (ref 26–34)
MCHC RBC AUTO-ENTMCNC: 32.7 G/DL (ref 30–36.5)
MCV RBC AUTO: 89.7 FL (ref 80–99)
NRBC # BLD: 0 K/UL (ref 0–0.01)
NRBC BLD-RTO: 0 PER 100 WBC
PLATELET # BLD AUTO: 257 K/UL (ref 150–400)
PMV BLD AUTO: 10.3 FL (ref 8.9–12.9)
POTASSIUM SERPL-SCNC: 4.5 MMOL/L (ref 3.5–5.1)
PROT SERPL-MCNC: 7.2 G/DL (ref 6.4–8.2)
RBC # BLD AUTO: 5.35 M/UL (ref 4.1–5.7)
SODIUM SERPL-SCNC: 140 MMOL/L (ref 136–145)
TRIGL SERPL-MCNC: 111 MG/DL (ref ?–150)
TSH SERPL DL<=0.05 MIU/L-ACNC: 1.01 UIU/ML (ref 0.36–3.74)
VLDLC SERPL CALC-MCNC: 22.2 MG/DL
WBC # BLD AUTO: 8.6 K/UL (ref 4.1–11.1)

## 2022-02-15 PROCEDURE — 99395 PREV VISIT EST AGE 18-39: CPT | Performed by: FAMILY MEDICINE

## 2022-02-15 NOTE — PROGRESS NOTES
Marti Peter  27 y.o. male  1990  [address] [place] [address]  [id number]     [address]       Encounter Date: 2/15/2022           Established Patient Visit Note: Leia Cha MD    Reason for Appointment:  Chief Complaint   Patient presents with    Complete Physical         History of Present Illness:  History provided by patient    Marti Peter is a 27 y.o. male who presents to clinic today for:     Annual Physical    Foot Pain: he has orthotocs and is not interested in seeing podiatry at this time    He has a history of Neuromuscular disorder with motor delay which he reports as stable    Diet: eats a variety of foods with his family  Exercise: he has not bee quite as active recently. 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 Autism, Dyslexia, Anxiety: Followed by psychiatry (Dr. Bob Dias) with last visit in Jan, 2021. Allergies: takes claritin with improvement    Obesity: weight has increased from prior visit    HM  He reports having 2 COVID vaccins; due for booster  Declines flu vaccine today      Review of Systems  Review of Systems   Constitutional: Negative for chills and fever. Respiratory: Negative for cough, shortness of breath and wheezing. Cardiovascular: Negative for chest pain and palpitations. Allergies: Patient has no known allergies. Medications:     Current Outpatient Medications:     loratadine (CLARITIN) 10 mg tablet, Take 1 Tab by mouth daily as needed for Allergies. , Disp: , Rfl:     escitalopram oxalate (LEXAPRO) 20 mg tablet, TAKE 2&1/2 TABLETS BY MOUTH EVERY MORNING, Disp: , Rfl: 5    methylphenidate ER 36 mg 24 hr tab, TAKE 2 TABLETS EVERY MORNING, Disp: , Rfl: 0    traZODone (DESYREL) 100 mg tablet, TAKE 2 TABLETS BY MOUTH AT BEDTIME, Disp: , Rfl: 5    History  Patient Care Team:  Paula Huber MD as PCP - General (Family Medicine)  Paula Huber MD as PCP [facility]  Tor Beltrán MD (Psychiatry - Child/Adolescent)    Past Medical History: he has a past medical history of Autism. Past Surgical History: he has no past surgical history on file. Family Medical History: family history includes Bleeding Prob in his maternal grandmother; Cancer in his paternal grandfather; Heart Disease in his maternal grandmother; Hypertension in his father; Migraines in his father. Social History: he reports that he quit smoking about 5 years ago. He has never used smokeless tobacco. He reports current alcohol use. He reports that he does not use drugs. Objective:   Visit Vitals  /80   Pulse 87   Temp 98.4 °F (36.9 °C)   Resp 16   Ht 6' 2\" (1.88 m)   Wt 260 lb (117.9 kg)   SpO2 98%   BMI 33.38 kg/m²     Wt Readings from Last 3 Encounters:   02/15/22 260 lb (117.9 kg)   01/07/21 254 lb (115.2 kg)   01/28/20 247 lb 9.6 oz (112.3 kg)       Physical Exam  HENT:      Head: Normocephalic and atraumatic. Right Ear: External ear normal.      Left Ear: External ear normal.      Nose: Nose normal.      Mouth/Throat:      Mouth: Mucous membranes are moist.      Pharynx: No oropharyngeal exudate. Eyes:      General: Lids are normal. No scleral icterus. Right eye: No discharge. Left eye: No discharge. Extraocular Movements: Extraocular movements intact. Conjunctiva/sclera: Conjunctivae normal.      Pupils: Pupils are equal, round, and reactive to light. Neck:      Thyroid: No thyromegaly. Vascular: No JVD. Trachea: No tracheal deviation. Cardiovascular:      Rate and Rhythm: Normal rate and regular rhythm. Pulses:           Radial pulses are 2+ on the right side and 2+ on the left side. Heart sounds: Murmur heard. Crescendo diastolic murmur is present with a grade of 2/4. No friction rub. No gallop. Pulmonary:      Effort: Pulmonary effort is normal. No respiratory distress. Breath sounds: Normal breath sounds. No stridor. No wheezing. Abdominal:      General: Bowel sounds are normal. There is no distension. Palpations: Abdomen is soft. There is no mass. Tenderness: There is no abdominal tenderness. There is no guarding or rebound. Musculoskeletal:         General: No tenderness or deformity. Normal range of motion. Cervical back: Normal range of motion and neck supple. Right lower leg: No edema. Left lower leg: No edema. Lymphadenopathy:      Cervical: No cervical adenopathy. Skin:     General: Skin is warm and dry. Neurological:      Mental Status: He is alert. Cranial Nerves: No cranial nerve deficit. Coordination: Coordination normal.      Gait: Gait is intact. Deep Tendon Reflexes: Reflexes normal.   Psychiatric:         Mood and Affect: Mood normal.         Behavior: Behavior normal.         Thought Content: Thought content normal.       Assessment & Plan:      ICD-10-CM ICD-9-CM    1. Annual physical exam  Z00.00 V70.0    2. Neuromuscular disease ([facility] [address]  G70.9 358.9    3. Murmur, cardiac  R01.1 785.2 REFERRAL TO CARDIOLOGY   4. Screening for thyroid disorder  Z13.29 V77.0 TSH 3RD GENERATION      TSH 3RD GENERATION   5. Screening, lipid  Z13.220 V77.91 LIPID PANEL      METABOLIC PANEL, COMPREHENSIVE      METABOLIC PANEL, COMPREHENSIVE      LIPID PANEL   6. Screening for diabetes mellitus  Z13.1 V77.1 HEMOGLOBIN A1C WITH EAG      HEMOGLOBIN A1C WITH EAG   7. Screening for deficiency anemia  Z13.0 V78.1 CBC W/O DIFF      CBC W/O DIFF   8. Autism spectrum disorder  F84.0 299.00    9. Motor developmental delay  F82 315.4    10. Dyslexia  R48.0 784.61    11. Obesity (BMI 30.0-34. 9)  E66.9 278.00    12. Chronic foot pain, right  M79.671 729.5     G89.29 338.29       Annual Physical Exam: Reviewed and addressed patient's medical history and concerns as discussed in note. Reviewed recommended screenings and immunizations. Discussed recommendations for diet, exercise, and lifestyle.    Murmur: New problem. Referral to cardiology for further evaluation. All other conditions listed above: Chronic, stable and/or managed by specialist. Will continue current treatment regimen. Will check labs as reflected above. Discussed following up with specialist as scheduled. Discussed recommendations for diet and exercise. I have discussed the diagnosis with the patient and the intended plan as seen in the above orders. The patient has received an after-visit summary along with patient information handout. I have discussed medication side effects and warnings with the patient as well. Disposition  Follow-up and Dispositions    · Return in about 1 year (around 2/15/2023).            Cameron Colby MD

## 2022-02-15 NOTE — PROGRESS NOTES
Chief Complaint   Patient presents with    Complete Physical        1. \"Have you been to the ER, urgent care clinic since your last visit? Hospitalized since your last visit? \" No    2. \"Have you seen or consulted any other health care providers outside of the [address] [place] [address] since your last visit? \" No     3. For patients aged 39-70: Has the patient had a colonoscopy? NA - based on age     If the patient is female:    4. For patients aged 41-77: Has the patient had a mammogram within the past 2 years? NA - based on age    11. For patients aged 21-30: Has the patient had a pap smear?  NA - based on age    1 most recent PHQ Screens 1/7/2021   Little interest or pleasure in doing things Not at all   Feeling down, depressed, irritable, or hopeless Not at all   Total Score PHQ 2 0

## 2022-02-16 DIAGNOSIS — R79.89 ELEVATED LFTS: Primary | ICD-10-CM

## 2022-02-17 NOTE — PROGRESS NOTES
Please let patient know that his ALT (liver lab) is a little elevated. I would like to evaluate this further with additional lab work and imaging. I have placed an ordered for an ultrasound and lab work. Please provide him with the phone number for central scheduling, and please schedule him for lab visit. The remainder of his labs are stable.

## 2022-02-18 ENCOUNTER — PATIENT MESSAGE (OUTPATIENT)
Dept: FAMILY MEDICINE CLINIC | Age: 32
End: 2022-02-18

## 2022-02-18 ENCOUNTER — HOSPITAL ENCOUNTER (OUTPATIENT)
Dept: ULTRASOUND IMAGING | Age: 32
Discharge: HOME | End: 2022-02-18
Attending: FAMILY MEDICINE
Payer: COMMERCIAL

## 2022-02-18 DIAGNOSIS — R79.89 ELEVATED LFTS: ICD-10-CM

## 2022-02-18 PROCEDURE — 76705 ECHO EXAM OF ABDOMEN: CPT

## 2022-02-24 NOTE — TELEPHONE ENCOUNTER
Brenda Gifford 2/18/2022 4:05 PM EST    Please advise patient on heart murmur and Cardiologist appointment  ----- Message -----  From: Georgina Payan  Sent: 2/18/2022 12:44 PM EST  To: [facility] Nurse Pool  Subject: Murmur and elevated AFT     Ysabel Garcia, checking in for Jocelyne Orozco)  Thank you for letting us know about the elevated ALT. We have the ultrasound scheduled this afternoon. Do you think that there could be a relationship between the heart murmur and the elevated ALT ? We have an appointment with Dr. Kay Mayen scheduled for 3/24, the first appointment available. Do you recommend for him to see a cardiologist before then? Can you expedite an appointment with him or recommend another Cardiologist if you believe that he needs the consult before 3/24? Call anytime if you would like to discuss it.   Thanks again,  [employer]

## 2022-03-18 PROBLEM — F82 MOTOR DEVELOPMENTAL DELAY: Status: ACTIVE | Noted: 2018-12-21

## 2022-03-19 PROBLEM — E66.811 OBESITY (BMI 30.0-34.9): Status: ACTIVE | Noted: 2018-12-21

## 2022-03-19 PROBLEM — E78.6 LOW HDL (UNDER 40): Status: ACTIVE | Noted: 2018-12-28

## 2022-03-19 PROBLEM — E66.9 OBESITY (BMI 30.0-34.9): Status: ACTIVE | Noted: 2018-12-21

## 2022-03-19 PROBLEM — R48.0 DYSLEXIA: Status: ACTIVE | Noted: 2018-12-21

## 2022-03-19 PROBLEM — M79.671 CHRONIC FOOT PAIN, RIGHT: Status: ACTIVE | Noted: 2020-01-28

## 2022-03-19 PROBLEM — F84.0 AUTISM SPECTRUM DISORDER: Status: ACTIVE | Noted: 2018-12-21

## 2022-03-19 PROBLEM — G89.29 CHRONIC FOOT PAIN, RIGHT: Status: ACTIVE | Noted: 2020-01-28

## 2022-03-19 PROBLEM — G70.9 NEUROMUSCULAR DISEASE (HCC): Status: ACTIVE | Noted: 2018-12-21

## 2022-03-20 PROBLEM — J30.9 ALLERGIC RHINITIS: Status: ACTIVE | Noted: 2020-01-29

## 2022-03-24 ENCOUNTER — TELEPHONE (OUTPATIENT)
Dept: FAMILY MEDICINE CLINIC | Age: 32
End: 2022-03-24

## 2022-03-24 NOTE — TELEPHONE ENCOUNTER
[address] called stating that the patient is at the cardiologist office for his appointment and the office said they have not received the referral.    Instant message also sent.

## 2022-03-29 NOTE — TELEPHONE ENCOUNTER
----- Message from Sony Lung sent at 3/24/2022  8:47 AM EDT -----  Subject: Message to Provider    QUESTIONS  Information for Provider? Pt's dad stated that they are at Dr. Makenzie Domínguez office   and the  staff there told him that a PA to his insurance company   for the echocardiogram wasn't sent as well as the order for it to the   office. There are at the office right now waiting, pls advise.  ---------------------------------------------------------------------------  --------------  CALL BACK INFO  What is the best way for the office to contact you? OK to leave message on   voicemail  Preferred Call Back Phone Number? [phone]  ---------------------------------------------------------------------------  --------------  SCRIPT ANSWERS  Relationship to Patient? Parent  Representative Name? Humza  Patient is under 25 and the Parent has custody? No  Is the Representative on the appropriate HIPAA document in Epic?  Yes

## 2022-03-29 NOTE — TELEPHONE ENCOUNTER
Spoke to Shaneka King at Dr. Robert Price office. Two pt identifiers confirmed. She stated that the patient had the appointment on 3/24 and the prior auth for Echo was approval and scheduled for May. Last OV notes faxed over from Dr. Robert Price office.  Gaye Flynn LPN

## 2022-03-30 ENCOUNTER — TELEPHONE (OUTPATIENT)
Dept: FAMILY MEDICINE CLINIC | Age: 32
End: 2022-03-30

## 2022-03-30 NOTE — TELEPHONE ENCOUNTER
----- Message from Sharonda Anderson sent at 3/29/2022  5:06 PM EDT -----  Subject: Message to Provider    QUESTIONS  Information for Provider? PT father Brianna Pierre is returning call to   office after hours and is requesting call back. ---------------------------------------------------------------------------  --------------  Ky Fraction INFO  What is the best way for the office to contact you? OK to leave message on   voicemail  Preferred Call Back Phone Number?  [phone]  ---------------------------------------------------------------------------  --------------  SCRIPT ANSWERS  undefined

## 2022-04-05 ENCOUNTER — LAB ONLY (OUTPATIENT)
Dept: FAMILY MEDICINE CLINIC | Age: 32
End: 2022-04-05

## 2022-04-05 DIAGNOSIS — Z13.29 SCREENING FOR THYROID DISORDER: ICD-10-CM

## 2022-04-05 DIAGNOSIS — Z13.220 SCREENING, LIPID: ICD-10-CM

## 2022-04-05 DIAGNOSIS — R79.89 ELEVATED LFTS: ICD-10-CM

## 2022-04-05 DIAGNOSIS — Z13.1 SCREENING FOR DIABETES MELLITUS: ICD-10-CM

## 2022-04-05 DIAGNOSIS — Z13.0 SCREENING FOR DEFICIENCY ANEMIA: ICD-10-CM

## 2022-04-05 LAB
ALBUMIN SERPL-MCNC: 4.2 G/DL (ref 3.5–5)
ALBUMIN/GLOB SERPL: 1.4 {RATIO} (ref 1.1–2.2)
ALP SERPL-CCNC: 45 U/L (ref 45–117)
ALT SERPL-CCNC: 84 U/L (ref 12–78)
ANION GAP SERPL CALC-SCNC: 6 MMOL/L (ref 5–15)
AST SERPL-CCNC: 24 U/L (ref 15–37)
BILIRUB SERPL-MCNC: 0.3 MG/DL (ref 0.2–1)
BUN SERPL-MCNC: 15 MG/DL (ref 6–20)
BUN/CREAT SERPL: 24 (ref 12–20)
CALCIUM SERPL-MCNC: 8.7 MG/DL (ref 8.5–10.1)
CHLORIDE SERPL-SCNC: 110 MMOL/L (ref 97–108)
CHOLEST SERPL-MCNC: 106 MG/DL
CO2 SERPL-SCNC: 23 MMOL/L (ref 21–32)
CREAT SERPL-MCNC: 0.62 MG/DL (ref 0.7–1.3)
ERYTHROCYTE [DISTWIDTH] IN BLOOD BY AUTOMATED COUNT: 12.7 % (ref 11.5–14.5)
EST. AVERAGE GLUCOSE BLD GHB EST-MCNC: 100 MG/DL
FERRITIN SERPL-MCNC: 421 NG/ML (ref 26–388)
GGT SERPL-CCNC: 37 U/L (ref 15–85)
GLOBULIN SER CALC-MCNC: 2.9 G/DL (ref 2–4)
GLUCOSE SERPL-MCNC: 104 MG/DL (ref 65–100)
HBA1C MFR BLD: 5.1 % (ref 4–5.6)
HCT VFR BLD AUTO: 47.3 % (ref 36.6–50.3)
HDLC SERPL-MCNC: 31 MG/DL
HDLC SERPL: 3.4 {RATIO} (ref 0–5)
HGB BLD-MCNC: 15.7 G/DL (ref 12.1–17)
IRON SATN MFR SERPL: 32 % (ref 20–50)
IRON SERPL-MCNC: 115 UG/DL (ref 35–150)
LDLC SERPL CALC-MCNC: 46.8 MG/DL (ref 0–100)
MCH RBC QN AUTO: 29.7 PG (ref 26–34)
MCHC RBC AUTO-ENTMCNC: 33.2 G/DL (ref 30–36.5)
MCV RBC AUTO: 89.4 FL (ref 80–99)
NRBC # BLD: 0 K/UL (ref 0–0.01)
NRBC BLD-RTO: 0 PER 100 WBC
PLATELET # BLD AUTO: 213 K/UL (ref 150–400)
PMV BLD AUTO: 10.9 FL (ref 8.9–12.9)
POTASSIUM SERPL-SCNC: 4 MMOL/L (ref 3.5–5.1)
PROT SERPL-MCNC: 7.1 G/DL (ref 6.4–8.2)
RBC # BLD AUTO: 5.29 M/UL (ref 4.1–5.7)
SODIUM SERPL-SCNC: 139 MMOL/L (ref 136–145)
TIBC SERPL-MCNC: 354 UG/DL (ref 250–450)
TRIGL SERPL-MCNC: 141 MG/DL (ref ?–150)
TSH SERPL DL<=0.05 MIU/L-ACNC: 1.28 UIU/ML (ref 0.36–3.74)
VLDLC SERPL CALC-MCNC: 28.2 MG/DL
WBC # BLD AUTO: 7.4 K/UL (ref 4.1–11.1)

## 2022-04-06 LAB
CERULOPLASMIN SERPL-MCNC: 15.4 MG/DL (ref 16–31)
HAV AB SER QL IA: NEGATIVE

## 2022-04-07 LAB
HCV AB S/CO SERPL IA: <0.1 S/CO RATIO (ref 0–0.9)
HCV AB SERPL QL IA: NORMAL

## 2022-04-08 ENCOUNTER — PATIENT MESSAGE (OUTPATIENT)
Dept: FAMILY MEDICINE CLINIC | Age: 32
End: 2022-04-08

## 2022-04-08 NOTE — TELEPHONE ENCOUNTER
Called patient's father and discussed further    1) no immunity to hep a- will provide vaccine at next visit  2) ceruloplasmin a little low and ferritin a little elevated.  Will repeat at next visit  3) follow up after completion of cardiology evaluation    Summer Carter MD

## 2022-04-08 NOTE — TELEPHONE ENCOUNTER
Stephen Killian, [place] 3/2/3410 4:49 PM EDT      ----- Message -----  From: Karen De La Rosa  Sent: 4/8/2022 4:04 PM EDT  To: [facility]  Subject: Follow-up     Dr. Arin Rodriguez. [facility] here, asking when should we schedule a follow-up visit for Whitley Bhagat? His abdominal ultrasound, follow-up lab work and ECHO. You should receive a report from Dr. Celine Allen early next week. His follow-up visit with Dr. Celine Allen is scheduled for the last week in May. I am trying to have it moved forward. I look forward to your thoughts. Thank you,  Manuel Mon.

## 2022-04-11 LAB
HBV CORE AB SERPL QL IA: NEGATIVE
HBV SURFACE AB SER QL: REACTIVE INDEX VALUE
HBV SURFACE AG SERPL QL IA: NEGATIVE
INTERPRETATION, 006720: NORMAL
RFX TO HBC IGM: NORMAL

## 2023-05-25 RX ORDER — LORATADINE 10 MG/1
10 TABLET ORAL DAILY PRN
COMMUNITY

## 2023-05-25 RX ORDER — METHYLPHENIDATE HYDROCHLORIDE 36 MG/1
TABLET, EXTENDED RELEASE ORAL
COMMUNITY
Start: 2018-11-14

## 2023-05-25 RX ORDER — ESCITALOPRAM OXALATE 20 MG/1
TABLET ORAL
COMMUNITY
Start: 2018-12-08

## 2023-05-25 RX ORDER — TRAZODONE HYDROCHLORIDE 100 MG/1
2 TABLET ORAL NIGHTLY
COMMUNITY
Start: 2018-12-08

## 2023-06-21 ENCOUNTER — OFFICE VISIT (OUTPATIENT)
Age: 33
End: 2023-06-21
Payer: COMMERCIAL

## 2023-06-21 VITALS
HEIGHT: 74 IN | OXYGEN SATURATION: 99 % | TEMPERATURE: 97.6 F | HEART RATE: 74 BPM | BODY MASS INDEX: 33.11 KG/M2 | WEIGHT: 258 LBS | RESPIRATION RATE: 18 BRPM | DIASTOLIC BLOOD PRESSURE: 80 MMHG | SYSTOLIC BLOOD PRESSURE: 138 MMHG

## 2023-06-21 DIAGNOSIS — H61.22 CERUMINOSIS, LEFT: ICD-10-CM

## 2023-06-21 DIAGNOSIS — E66.09 CLASS 1 OBESITY DUE TO EXCESS CALORIES WITHOUT SERIOUS COMORBIDITY WITH BODY MASS INDEX (BMI) OF 33.0 TO 33.9 IN ADULT: ICD-10-CM

## 2023-06-21 DIAGNOSIS — Z00.00 ENCOUNTER FOR PREVENTIVE HEALTH EXAMINATION: Primary | ICD-10-CM

## 2023-06-21 DIAGNOSIS — I10 PRIMARY HYPERTENSION: ICD-10-CM

## 2023-06-21 DIAGNOSIS — F84.0 AUTISM SPECTRUM DISORDER: ICD-10-CM

## 2023-06-21 PROCEDURE — 99395 PREV VISIT EST AGE 18-39: CPT | Performed by: STUDENT IN AN ORGANIZED HEALTH CARE EDUCATION/TRAINING PROGRAM

## 2023-06-21 PROCEDURE — 3079F DIAST BP 80-89 MM HG: CPT | Performed by: STUDENT IN AN ORGANIZED HEALTH CARE EDUCATION/TRAINING PROGRAM

## 2023-06-21 PROCEDURE — 3075F SYST BP GE 130 - 139MM HG: CPT | Performed by: STUDENT IN AN ORGANIZED HEALTH CARE EDUCATION/TRAINING PROGRAM

## 2023-06-21 PROCEDURE — 99213 OFFICE O/P EST LOW 20 MIN: CPT | Performed by: STUDENT IN AN ORGANIZED HEALTH CARE EDUCATION/TRAINING PROGRAM

## 2023-06-21 PROCEDURE — PBSHW REMOVAL IMPACTED CERUMEN IRRIGATION/LVG UNILAT: Performed by: STUDENT IN AN ORGANIZED HEALTH CARE EDUCATION/TRAINING PROGRAM

## 2023-06-21 PROCEDURE — 69209 REMOVE IMPACTED EAR WAX UNI: CPT | Performed by: STUDENT IN AN ORGANIZED HEALTH CARE EDUCATION/TRAINING PROGRAM

## 2023-06-21 RX ORDER — METHYLPHENIDATE HYDROCHLORIDE 36 MG/1
TABLET, EXTENDED RELEASE ORAL
COMMUNITY
Start: 2023-06-08 | End: 2023-06-21 | Stop reason: SDUPTHER

## 2023-06-21 RX ORDER — LOSARTAN POTASSIUM 50 MG/1
50 TABLET ORAL DAILY
COMMUNITY
Start: 2023-06-15

## 2023-06-21 SDOH — ECONOMIC STABILITY: HOUSING INSECURITY
IN THE LAST 12 MONTHS, WAS THERE A TIME WHEN YOU DID NOT HAVE A STEADY PLACE TO SLEEP OR SLEPT IN A SHELTER (INCLUDING NOW)?: NO

## 2023-06-21 SDOH — ECONOMIC STABILITY: INCOME INSECURITY: HOW HARD IS IT FOR YOU TO PAY FOR THE VERY BASICS LIKE FOOD, HOUSING, MEDICAL CARE, AND HEATING?: NOT HARD AT ALL

## 2023-06-21 SDOH — ECONOMIC STABILITY: FOOD INSECURITY: WITHIN THE PAST 12 MONTHS, THE FOOD YOU BOUGHT JUST DIDN'T LAST AND YOU DIDN'T HAVE MONEY TO GET MORE.: NEVER TRUE

## 2023-06-21 SDOH — ECONOMIC STABILITY: FOOD INSECURITY: WITHIN THE PAST 12 MONTHS, YOU WORRIED THAT YOUR FOOD WOULD RUN OUT BEFORE YOU GOT MONEY TO BUY MORE.: NEVER TRUE

## 2023-06-21 ASSESSMENT — ANXIETY QUESTIONNAIRES
2. NOT BEING ABLE TO STOP OR CONTROL WORRYING: 0
IF YOU CHECKED OFF ANY PROBLEMS ON THIS QUESTIONNAIRE, HOW DIFFICULT HAVE THESE PROBLEMS MADE IT FOR YOU TO DO YOUR WORK, TAKE CARE OF THINGS AT HOME, OR GET ALONG WITH OTHER PEOPLE: NOT DIFFICULT AT ALL
6. BECOMING EASILY ANNOYED OR IRRITABLE: 0
GAD7 TOTAL SCORE: 0
3. WORRYING TOO MUCH ABOUT DIFFERENT THINGS: 0
7. FEELING AFRAID AS IF SOMETHING AWFUL MIGHT HAPPEN: 0
1. FEELING NERVOUS, ANXIOUS, OR ON EDGE: 0
4. TROUBLE RELAXING: 0
5. BEING SO RESTLESS THAT IT IS HARD TO SIT STILL: 0

## 2023-06-21 ASSESSMENT — PATIENT HEALTH QUESTIONNAIRE - PHQ9
9. THOUGHTS THAT YOU WOULD BE BETTER OFF DEAD, OR OF HURTING YOURSELF: NOT AT ALL
8. MOVING OR SPEAKING SO SLOWLY THAT OTHER PEOPLE COULD HAVE NOTICED. OR THE OPPOSITE, BEING SO FIGETY OR RESTLESS THAT YOU HAVE BEEN MOVING AROUND A LOT MORE THAN USUAL: NOT AT ALL
6. FEELING BAD ABOUT YOURSELF - OR THAT YOU ARE A FAILURE OR HAVE LET YOURSELF OR YOUR FAMILY DOWN: NOT AT ALL
3. TROUBLE FALLING OR STAYING ASLEEP OR SLEEPING TOO MUCH: NOT AT ALL
2. FEELING DOWN, DEPRESSED OR HOPELESS: NOT AT ALL
4. FEELING TIRED OR HAVING LITTLE ENERGY: NOT AT ALL
1. LITTLE INTEREST OR PLEASURE IN DOING THINGS: NOT AT ALL
7. TROUBLE CONCENTRATING ON THINGS, SUCH AS READING THE NEWSPAPER OR WATCHING TELEVISION: NOT AT ALL
5. POOR APPETITE OR OVEREATING: NOT AT ALL
SUM OF ALL RESPONSES TO PHQ QUESTIONS 1-9: 0
SUM OF ALL RESPONSES TO PHQ9 QUESTIONS 1 & 2: 0

## 2023-06-21 NOTE — PROGRESS NOTES
Chief Complaint   Patient presents with    Establish Care     Autism ,        1. \"Have you been to the ER, urgent care clinic since your last visit? Hospitalized since your last visit? \" no  2. \"Have you seen or consulted any other health care providers outside of the [address] [place] [address] since your last visit? \" yes - eye doctor 2023   3. For patients aged 39-70: Has the patient had a colonoscopy / FIT/ Cologuard? NA - based on age         Financial Resource Strain: Low Risk     Difficulty of Paying Living Expenses: Not hard at all      Food Insecurity: No Food Insecurity    Worried About [address] [employer] in the Last Year: Never true    [address] in the Last Year: Never true        No flowsheet data found. No flowsheet data found.      PHQ-9  2/15/2022   Little interest or pleasure in doing things 0   Total Score PHQ 2 0       Health Maintenance Due   Topic Date Due    COVID-19 Vaccine (1) Never done    Varicella vaccine (2 of 2 - 2-dose childhood series) 05/16/2000    Hepatitis A vaccine (2 of 2 - 2-dose series) 12/19/2006    Depression Screen  02/15/2023
concerning future plans. I have discussed medication side effects and warnings with the patient as well. Informed pt to return to the office if new symptoms arise.       Giuliano Cain MD  [facility]

## 2023-06-22 LAB
ALBUMIN SERPL-MCNC: 4.6 G/DL (ref 3.5–5)
ALBUMIN/GLOB SERPL: 1.5 (ref 1.1–2.2)
ALP SERPL-CCNC: 55 U/L (ref 45–117)
ALT SERPL-CCNC: 88 U/L (ref 12–78)
ANION GAP SERPL CALC-SCNC: 7 MMOL/L (ref 5–15)
AST SERPL-CCNC: 28 U/L (ref 15–37)
BILIRUB SERPL-MCNC: 0.7 MG/DL (ref 0.2–1)
BUN SERPL-MCNC: 15 MG/DL (ref 6–20)
BUN/CREAT SERPL: 20 (ref 12–20)
CALCIUM SERPL-MCNC: 9.4 MG/DL (ref 8.5–10.1)
CHLORIDE SERPL-SCNC: 107 MMOL/L (ref 97–108)
CHOLEST SERPL-MCNC: 108 MG/DL
CO2 SERPL-SCNC: 27 MMOL/L (ref 21–32)
CREAT SERPL-MCNC: 0.76 MG/DL (ref 0.7–1.3)
ERYTHROCYTE [DISTWIDTH] IN BLOOD BY AUTOMATED COUNT: 12.6 % (ref 11.5–14.5)
EST. AVERAGE GLUCOSE BLD GHB EST-MCNC: 100 MG/DL
GLOBULIN SER CALC-MCNC: 3 G/DL (ref 2–4)
GLUCOSE SERPL-MCNC: 90 MG/DL (ref 65–100)
HBA1C MFR BLD: 5.1 % (ref 4–5.6)
HCT VFR BLD AUTO: 49.4 % (ref 36.6–50.3)
HDLC SERPL-MCNC: 36 MG/DL
HDLC SERPL: 3 (ref 0–5)
HGB BLD-MCNC: 16.1 G/DL (ref 12.1–17)
LDLC SERPL CALC-MCNC: 47.6 MG/DL (ref 0–100)
MCH RBC QN AUTO: 28.6 PG (ref 26–34)
MCHC RBC AUTO-ENTMCNC: 32.6 G/DL (ref 30–36.5)
MCV RBC AUTO: 87.9 FL (ref 80–99)
NRBC # BLD: 0 K/UL (ref 0–0.01)
NRBC BLD-RTO: 0 PER 100 WBC
PLATELET # BLD AUTO: 262 K/UL (ref 150–400)
PMV BLD AUTO: 11.1 FL (ref 8.9–12.9)
POTASSIUM SERPL-SCNC: 4.2 MMOL/L (ref 3.5–5.1)
PROT SERPL-MCNC: 7.6 G/DL (ref 6.4–8.2)
RBC # BLD AUTO: 5.62 M/UL (ref 4.1–5.7)
SODIUM SERPL-SCNC: 141 MMOL/L (ref 136–145)
TRIGL SERPL-MCNC: 122 MG/DL
TSH SERPL DL<=0.05 MIU/L-ACNC: 0.94 UIU/ML (ref 0.36–3.74)
VLDLC SERPL CALC-MCNC: 24.4 MG/DL
WBC # BLD AUTO: 8.6 K/UL (ref 4.1–11.1)

## 2024-10-15 ENCOUNTER — OFFICE VISIT (OUTPATIENT)
Age: 34
End: 2024-10-15
Payer: COMMERCIAL

## 2024-10-15 VITALS
WEIGHT: 259 LBS | HEIGHT: 74 IN | DIASTOLIC BLOOD PRESSURE: 66 MMHG | SYSTOLIC BLOOD PRESSURE: 125 MMHG | RESPIRATION RATE: 16 BRPM | OXYGEN SATURATION: 99 % | BODY MASS INDEX: 33.24 KG/M2 | TEMPERATURE: 97.1 F | HEART RATE: 78 BPM

## 2024-10-15 DIAGNOSIS — Z00.00 WELLNESS EXAMINATION: Primary | ICD-10-CM

## 2024-10-15 DIAGNOSIS — Z23 ENCOUNTER FOR IMMUNIZATION: ICD-10-CM

## 2024-10-15 DIAGNOSIS — F84.0 AUTISM SPECTRUM DISORDER: ICD-10-CM

## 2024-10-15 DIAGNOSIS — I10 PRIMARY HYPERTENSION: ICD-10-CM

## 2024-10-15 DIAGNOSIS — E66.811 OBESITY (BMI 30.0-34.9): ICD-10-CM

## 2024-10-15 PROCEDURE — 3078F DIAST BP <80 MM HG: CPT | Performed by: STUDENT IN AN ORGANIZED HEALTH CARE EDUCATION/TRAINING PROGRAM

## 2024-10-15 PROCEDURE — 99395 PREV VISIT EST AGE 18-39: CPT | Performed by: STUDENT IN AN ORGANIZED HEALTH CARE EDUCATION/TRAINING PROGRAM

## 2024-10-15 PROCEDURE — 90715 TDAP VACCINE 7 YRS/> IM: CPT | Performed by: STUDENT IN AN ORGANIZED HEALTH CARE EDUCATION/TRAINING PROGRAM

## 2024-10-15 PROCEDURE — 3074F SYST BP LT 130 MM HG: CPT | Performed by: STUDENT IN AN ORGANIZED HEALTH CARE EDUCATION/TRAINING PROGRAM

## 2024-10-15 PROCEDURE — PBSHW PBB SHADOW CHARGE: Performed by: STUDENT IN AN ORGANIZED HEALTH CARE EDUCATION/TRAINING PROGRAM

## 2024-10-15 PROCEDURE — PBSHW TDAP, BOOSTRIX, (AGE 10 YRS+), IM: Performed by: STUDENT IN AN ORGANIZED HEALTH CARE EDUCATION/TRAINING PROGRAM

## 2024-10-15 RX ORDER — METHYLPHENIDATE HYDROCHLORIDE 36 MG/1
36 TABLET, EXTENDED RELEASE ORAL
COMMUNITY
Start: 2024-09-12

## 2024-10-15 RX ORDER — CITALOPRAM HYDROBROMIDE 40 MG/1
40 TABLET ORAL EVERY MORNING
COMMUNITY
Start: 2024-09-30 | End: 2024-10-15

## 2024-10-15 SDOH — ECONOMIC STABILITY: FOOD INSECURITY: WITHIN THE PAST 12 MONTHS, THE FOOD YOU BOUGHT JUST DIDN'T LAST AND YOU DIDN'T HAVE MONEY TO GET MORE.: NEVER TRUE

## 2024-10-15 SDOH — ECONOMIC STABILITY: FOOD INSECURITY: WITHIN THE PAST 12 MONTHS, YOU WORRIED THAT YOUR FOOD WOULD RUN OUT BEFORE YOU GOT MONEY TO BUY MORE.: NEVER TRUE

## 2024-10-15 SDOH — ECONOMIC STABILITY: INCOME INSECURITY: HOW HARD IS IT FOR YOU TO PAY FOR THE VERY BASICS LIKE FOOD, HOUSING, MEDICAL CARE, AND HEATING?: NOT HARD AT ALL

## 2024-10-15 ASSESSMENT — PATIENT HEALTH QUESTIONNAIRE - PHQ9
SUM OF ALL RESPONSES TO PHQ QUESTIONS 1-9: 0
2. FEELING DOWN, DEPRESSED OR HOPELESS: NOT AT ALL
1. LITTLE INTEREST OR PLEASURE IN DOING THINGS: NOT AT ALL
SUM OF ALL RESPONSES TO PHQ QUESTIONS 1-9: 0
SUM OF ALL RESPONSES TO PHQ9 QUESTIONS 1 & 2: 0

## 2024-10-15 NOTE — PROGRESS NOTES
Deanna Vazquez  34 y.o. male  1990  [id number] [place] [address]  [id number]     [facility]     Chief Complaint:   Chief Complaint   Patient presents with    Annual Exam     Physical   Source: self, the medical record     Subjective:   Deanna Vazquez is an 34 y.o. male who presents for complete physical exam.    HTN: on losartan through Dr Glez    Allergies - reviewed:   No Known Allergies      Medications - reviewed:  Current Outpatient Medications   Medication Sig    CONCERTA 36 MG extended release tablet Take 1 tablet by mouth. TAKE 2 TABLETS EVERY MORNING    losartan (COZAAR) 50 MG tablet Take 1 tablet by mouth daily    escitalopram (LEXAPRO) 20 MG tablet TAKE 2&1/2 TABLETS BY MOUTH EVERY MORNING    loratadine (CLARITIN) 10 MG tablet Take 1 tablet by mouth daily as needed    traZODone (DESYREL) 100 MG tablet Take 2 tablets by mouth nightly     No current facility-administered medications for this visit.         Past Medical History - reviewed:  Past Medical History:   Diagnosis Date    Autism          Past Surgical History - reviewed:  History reviewed. No pertinent surgical history.      Family History - reviewed:  Family History   Problem Relation Age of Onset    Hypertension Father     Migraines Father     Bleeding Prob Maternal Grandmother         prostate    Heart Disease Maternal Grandmother     Diabetes Paternal Grandmother     Cancer Paternal Grandfather     Colon Cancer Neg Hx          Social History - reviewed:  Social History     Socioeconomic History    Marital status: Single     Spouse name: Not on file    Number of children: Not on file    Years of education: Not on file    Highest education level: Not on file   Occupational History    Not on file   Tobacco Use    Smoking status: Former     Current packs/day: 0.00     Types: Cigarettes     Quit date: 2016     Years since quittin.8     Passive exposure: Past    Smokeless tobacco: Never

## 2024-10-15 NOTE — PROGRESS NOTES
Chief Complaint   Patient presents with    Annual Exam     Physical      \"Have you been to the ER, urgent care clinic since your last visit?  Hospitalized since your last visit?\"    NO    “Have you seen or consulted any other health care providers outside our system since your last visit?”    NO    After obtaining consent, and per orders of Dr. Alva, injection of Tdap Vaccine given by DALLAS CLEMENTE LPN. Patient instructed to remain in clinic for 20 minutes afterwards, and to report any adverse reaction to me immediately.

## 2024-10-17 DIAGNOSIS — Z00.00 WELLNESS EXAMINATION: ICD-10-CM

## 2024-10-17 DIAGNOSIS — E66.811 OBESITY (BMI 30.0-34.9): ICD-10-CM

## 2024-10-18 LAB
ALBUMIN SERPL-MCNC: 4.7 G/DL (ref 4.1–5.1)
ALP SERPL-CCNC: 47 IU/L (ref 44–121)
ALT SERPL-CCNC: 31 IU/L (ref 0–44)
AST SERPL-CCNC: 15 IU/L (ref 0–40)
BILIRUB SERPL-MCNC: 0.6 MG/DL (ref 0–1.2)
BUN SERPL-MCNC: 13 MG/DL (ref 6–20)
BUN/CREAT SERPL: 17 (ref 9–20)
CALCIUM SERPL-MCNC: 9.2 MG/DL (ref 8.7–10.2)
CHLORIDE SERPL-SCNC: 105 MMOL/L (ref 96–106)
CHOLEST SERPL-MCNC: 107 MG/DL (ref 100–199)
CO2 SERPL-SCNC: 19 MMOL/L (ref 20–29)
CREAT SERPL-MCNC: 0.78 MG/DL (ref 0.76–1.27)
EGFRCR SERPLBLD CKD-EPI 2021: 119 ML/MIN/1.73
ERYTHROCYTE [DISTWIDTH] IN BLOOD BY AUTOMATED COUNT: 12.8 % (ref 11.6–15.4)
GLOBULIN SER CALC-MCNC: 2.5 G/DL (ref 1.5–4.5)
GLUCOSE SERPL-MCNC: 93 MG/DL (ref 70–99)
HBA1C MFR BLD: 5.4 % (ref 4.8–5.6)
HCT VFR BLD AUTO: 48.2 % (ref 37.5–51)
HDLC SERPL-MCNC: 36 MG/DL
HGB BLD-MCNC: 15.6 G/DL (ref 13–17.7)
LDLC SERPL CALC-MCNC: 56 MG/DL (ref 0–99)
MCH RBC QN AUTO: 29.7 PG (ref 26.6–33)
MCHC RBC AUTO-ENTMCNC: 32.4 G/DL (ref 31.5–35.7)
MCV RBC AUTO: 92 FL (ref 79–97)
PLATELET # BLD AUTO: 209 X10E3/UL (ref 150–450)
POTASSIUM SERPL-SCNC: 4.6 MMOL/L (ref 3.5–5.2)
PROT SERPL-MCNC: 7.2 G/DL (ref 6–8.5)
RBC # BLD AUTO: 5.25 X10E6/UL (ref 4.14–5.8)
SODIUM SERPL-SCNC: 141 MMOL/L (ref 134–144)
TRIGL SERPL-MCNC: 70 MG/DL (ref 0–149)
TSH SERPL DL<=0.005 MIU/L-ACNC: 1.24 UIU/ML (ref 0.45–4.5)
VLDLC SERPL CALC-MCNC: 15 MG/DL (ref 5–40)
WBC # BLD AUTO: 8.2 X10E3/UL (ref 3.4–10.8)

## 2025-06-06 ENCOUNTER — OFFICE VISIT (OUTPATIENT)
Age: 35
End: 2025-06-06
Payer: COMMERCIAL

## 2025-06-06 VITALS
HEART RATE: 103 BPM | TEMPERATURE: 97.4 F | HEIGHT: 74 IN | BODY MASS INDEX: 33.11 KG/M2 | WEIGHT: 258 LBS | OXYGEN SATURATION: 98 % | RESPIRATION RATE: 18 BRPM | DIASTOLIC BLOOD PRESSURE: 77 MMHG | SYSTOLIC BLOOD PRESSURE: 130 MMHG

## 2025-06-06 DIAGNOSIS — F84.0 AUTISM SPECTRUM DISORDER: ICD-10-CM

## 2025-06-06 DIAGNOSIS — I10 PRIMARY HYPERTENSION: Primary | ICD-10-CM

## 2025-06-06 DIAGNOSIS — Z51.81 THERAPEUTIC DRUG MONITORING: ICD-10-CM

## 2025-06-06 DIAGNOSIS — E66.811 OBESITY (BMI 30.0-34.9): ICD-10-CM

## 2025-06-06 PROCEDURE — 3075F SYST BP GE 130 - 139MM HG: CPT | Performed by: NURSE PRACTITIONER

## 2025-06-06 PROCEDURE — 99213 OFFICE O/P EST LOW 20 MIN: CPT | Performed by: NURSE PRACTITIONER

## 2025-06-06 PROCEDURE — 3078F DIAST BP <80 MM HG: CPT | Performed by: NURSE PRACTITIONER

## 2025-06-06 RX ORDER — CITALOPRAM HYDROBROMIDE 40 MG/1
40 TABLET ORAL EVERY MORNING
COMMUNITY
Start: 2025-03-23 | End: 2025-06-06

## 2025-06-06 SDOH — ECONOMIC STABILITY: FOOD INSECURITY
WITHIN THE PAST 12 MONTHS, THE FOOD YOU BOUGHT JUST DIDN'T LAST AND YOU DIDN'T HAVE MONEY TO GET MORE.: PATIENT UNABLE TO ANSWER

## 2025-06-06 SDOH — ECONOMIC STABILITY: FOOD INSECURITY
WITHIN THE PAST 12 MONTHS, YOU WORRIED THAT YOUR FOOD WOULD RUN OUT BEFORE YOU GOT MONEY TO BUY MORE.: PATIENT UNABLE TO ANSWER

## 2025-06-06 ASSESSMENT — PATIENT HEALTH QUESTIONNAIRE - PHQ9
SUM OF ALL RESPONSES TO PHQ QUESTIONS 1-9: 0
SUM OF ALL RESPONSES TO PHQ QUESTIONS 1-9: 0
2. FEELING DOWN, DEPRESSED OR HOPELESS: NOT AT ALL
SUM OF ALL RESPONSES TO PHQ QUESTIONS 1-9: 0
1. LITTLE INTEREST OR PLEASURE IN DOING THINGS: NOT AT ALL
SUM OF ALL RESPONSES TO PHQ QUESTIONS 1-9: 0

## 2025-06-06 ASSESSMENT — ENCOUNTER SYMPTOMS
SHORTNESS OF BREATH: 0
COUGH: 0

## 2025-06-06 NOTE — PROGRESS NOTES
Chief Complaint   Patient presents with    New Patient     Previous PCP: Dr. Clarisa Alva - notes in chart     \"Have you been to the ER, urgent care clinic since your last visit?  Hospitalized since your last visit?\"    NO    “Have you seen or consulted any other health care providers outside of [facility] since your last visit?”    Dr. Cisneros -   Dr. Glez - cardiology                    6/6/2025     9:48 AM   PHQ-9    Little interest or pleasure in doing things 0   Feeling down, depressed, or hopeless 0   PHQ-2 Score 0   PHQ-9 Total Score 0           Financial Resource Strain: Low Risk  (10/15/2024)    Overall Financial Resource Strain (CARDIA)     Difficulty of Paying Living Expenses: Not hard at all      Food Insecurity: Patient Unable To Answer (6/6/2025)    Hunger Vital Sign     Worried About Running Out of Food in the Last Year: Patient unable to answer     Ran Out of Food in the Last Year: Patient unable to answer          Health Maintenance Due   Topic Date Due    Varicella vaccine (2 of 2 - 2-dose childhood series) 05/16/2000    Hepatitis A vaccine (2 of 2 - 2-dose series) 12/19/2006    Hepatitis B vaccine (1 of 3 - 19+ 3-dose series) Never done    COVID-19 Vaccine (3 - 2024-25 season) 09/01/2024      
face to face with the patient discussing the diagnosis and importance of compliance with the treatment plan as well as documenting on the day of the visit.      Diagnosis and plan discussed with patient who verbalized understanding and agrees with the plan.